# Patient Record
Sex: MALE | Race: BLACK OR AFRICAN AMERICAN | Employment: UNEMPLOYED | URBAN - METROPOLITAN AREA
[De-identification: names, ages, dates, MRNs, and addresses within clinical notes are randomized per-mention and may not be internally consistent; named-entity substitution may affect disease eponyms.]

---

## 2018-12-12 ENCOUNTER — HOSPITAL ENCOUNTER (EMERGENCY)
Age: 23
Discharge: HOME | End: 2018-12-12
Attending: EMERGENCY MEDICINE
Payer: SELF-PAY

## 2018-12-12 VITALS
HEIGHT: 65 IN | WEIGHT: 125 LBS | BODY MASS INDEX: 20.83 KG/M2 | DIASTOLIC BLOOD PRESSURE: 75 MMHG | HEART RATE: 78 BPM | RESPIRATION RATE: 18 BRPM | TEMPERATURE: 98.1 F | SYSTOLIC BLOOD PRESSURE: 123 MMHG | OXYGEN SATURATION: 100 %

## 2018-12-12 DIAGNOSIS — R56.9 SEIZURE (HCC): Primary | ICD-10-CM

## 2018-12-12 DIAGNOSIS — F12.10 CANNABIS ABUSE: ICD-10-CM

## 2018-12-12 LAB
AMPHET UR QL SCN: NEGATIVE
APPEARANCE UR: CLEAR
BACTERIA URNS QL MICRO: ABNORMAL /HPF
BARBITURATES UR QL SCN: NEGATIVE
BENZODIAZ UR QL: NEGATIVE
BILIRUB UR QL: NEGATIVE
CANNABINOIDS UR QL SCN: POSITIVE
CARBAMAZEPINE SERPL-MCNC: 6.5 UG/ML (ref 4–12)
COCAINE UR QL SCN: NEGATIVE
COLOR UR: YELLOW
EPITH CASTS URNS QL MICRO: ABNORMAL /LPF (ref 0–5)
GLUCOSE UR STRIP.AUTO-MCNC: NEGATIVE MG/DL
HDSCOM,HDSCOM: ABNORMAL
HGB UR QL STRIP: ABNORMAL
KETONES UR QL STRIP.AUTO: NEGATIVE MG/DL
LEUKOCYTE ESTERASE UR QL STRIP.AUTO: NEGATIVE
METHADONE UR QL: NEGATIVE
NITRITE UR QL STRIP.AUTO: NEGATIVE
OPIATES UR QL: NEGATIVE
PCP UR QL: NEGATIVE
PH UR STRIP: 5 [PH] (ref 5–8)
PROT UR STRIP-MCNC: 30 MG/DL
RBC #/AREA URNS HPF: ABNORMAL /HPF (ref 0–5)
SP GR UR REFRACTOMETRY: 1.02 (ref 1–1.03)
UROBILINOGEN UR QL STRIP.AUTO: 0.2 EU/DL (ref 0.2–1)
WBC URNS QL MICRO: ABNORMAL /HPF (ref 0–5)

## 2018-12-12 PROCEDURE — 99285 EMERGENCY DEPT VISIT HI MDM: CPT

## 2018-12-12 PROCEDURE — 81001 URINALYSIS AUTO W/SCOPE: CPT

## 2018-12-12 PROCEDURE — 80307 DRUG TEST PRSMV CHEM ANLYZR: CPT

## 2018-12-12 PROCEDURE — 74011250637 HC RX REV CODE- 250/637: Performed by: EMERGENCY MEDICINE

## 2018-12-12 PROCEDURE — 80156 ASSAY CARBAMAZEPINE TOTAL: CPT

## 2018-12-12 RX ORDER — ACETAMINOPHEN 500 MG
1000 TABLET ORAL
Status: COMPLETED | OUTPATIENT
Start: 2018-12-12 | End: 2018-12-12

## 2018-12-12 RX ORDER — CARBAMAZEPINE 200 MG/1
400 TABLET ORAL ONCE
Status: COMPLETED | OUTPATIENT
Start: 2018-12-12 | End: 2018-12-12

## 2018-12-12 RX ADMIN — ACETAMINOPHEN 1000 MG: 500 TABLET ORAL at 17:34

## 2018-12-12 RX ADMIN — CARBAMAZEPINE 400 MG: 200 TABLET ORAL at 19:23

## 2018-12-12 NOTE — DISCHARGE INSTRUCTIONS
Marijuana Use: Care Instructions  Your Care Instructions  During your exam, traces of marijuana were found in your body. The active chemical in marijuana is THC. THC usually can be found in urine for a few days after marijuana is used. If use is heavy, THC may be found for weeks after use has stopped. In the [place], marijuana laws vary widely. The drug is legal in some states, mainly as a medical treatment. But marijuana possession is still a crime under federal law. Many employers have strict rules about drug use. Many do drug testing. A positive drug test might cause you to lose your job. Or it might keep you from getting hired. Follow-up care is a key part of your treatment and safety. Be sure to make and go to all appointments, and call your doctor if you are having problems. It's also a good idea to know your test results and keep a list of the medicines you take. How can you care for yourself at home? · If you use marijuana, use it safely. Do not drive or operate heavy equipment. Using marijuana may affect your judgment and coordination. It can also increase your risk of being in a car crash. · Make sure you understand the laws in your area. Using marijuana may cause legal problems. · Know your employer's policies. When should you call for help? Watch closely for changes in your health, and contact your doctor if you think you have a problem with marijuana use. Where can you learn more? Go to http://charline-candie.info/. Enter K401 in the search box to learn more about \"Marijuana Use: Care Instructions. \"  Current as of: May 8, 2018  Content Version: 11.8  © 1803-9157 Healthwise[employer]. Care instructions adapted under license by [employer] (which disclaims liability or warranty for this information).  If you have questions about a medical condition or this instruction, always ask your healthcare professional. [address] any warranty or liability for your use of this information. Seizure: Care Instructions  Your Care Instructions    Seizures are caused by abnormal patterns of electrical signals in the brain. They are different for each person. Seizures can affect movement, speech, vision, or awareness. Some people have only slight shaking of a hand and do not pass out. Other people may pass out and have violent shaking of the whole body. Some people appear to stare into space. They are awake, but they can't respond normally. Later, they may not remember what happened. You may need tests to identify the type and cause of the seizures. A seizure may occur only once, or you may have them more than one time. Taking medicines as directed and following up with your doctor may help keep you from having more seizures. The doctor has checked you carefully, but problems can develop later. If you notice any problems or new symptoms, get medical treatment right away. Follow-up care is a key part of your treatment and safety. Be sure to make and go to all appointments, and call your doctor if you are having problems. It's also a good idea to know your test results and keep a list of the medicines you take. How can you care for yourself at home? · Be safe with medicines. Take your medicines exactly as prescribed. Call your doctor if you think you are having a problem with your medicine. · Do not do any activity that could be dangerous to you or others until your doctor says it is safe to do so. For example, do not drive a car, operate machinery, swim, or climb ladders. · Be sure that anyone treating you for any health problem knows that you have had a seizure and what medicines you are taking for it. · Identify and avoid things that may make you more likely to have a seizure. These may include lack of sleep, alcohol or drug use, stress, or not eating. · Make sure you go to your follow-up appointment.   When should you call for help?  Call 911 anytime you think you may need emergency care. For example, call if:    · You have another seizure.     · You have more than one seizure in 24 hours.     · You have new symptoms, such as trouble walking, speaking, or thinking clearly.    Call your doctor now or seek immediate medical care if:    · You are not acting normally.    Watch closely for changes in your health, and be sure to contact your doctor if you have any problems. Where can you learn more? Go to http://charline-candie.info/. Enter B004 in the search box to learn more about \"Seizure: Care Instructions. \"  Current as of: June 4, 2018  Content Version: 11.8  © 3164-1297 Healthwise, [employer]. Care instructions adapted under license by [employer] (which disclaims liability or warranty for this information). If you have questions about a medical condition or this instruction, always ask your healthcare professional. [address] any warranty or liability for your use of this information.

## 2018-12-12 NOTE — ED TRIAGE NOTES
Pt arrived per EMS post-ictal from seizure at the house of his child's mother. Pt w/ complaints of headache. Pt reports taking his tegretol today for the first 2 doses out of the 3 during the day.

## 2018-12-12 NOTE — ED PROVIDER NOTES
EMERGENCY DEPARTMENT HISTORY AND PHYSICAL EXAM    Date: 12/12/2018  Patient Name: Leighann Ring. History of Presenting Illness     Chief Complaint   Patient presents with    Seizure         History Provided By: Patient    Chief Complaint: seizure  Duration: PTA   Timing:  Acute  Location: generalized  Quality: grand mal  Associated Symptoms: HA    Additional History (Context):   5:17 PM  Leighann Caballero is a 24 y.o. male with PMHx of seizure who presents to the emergency department via EMS for evaluation of witnessed grand mal seizure onset PTA. Pt fell against a wall, knocked a television over and landed on his couch. Associated sxs include HA. Pt states he takes Tegretol for seizures. FSBS at 136 mg/dL. Pt is followed by neurologist, Dr. Darnell Warner, at [facility], [place], operated by [facility]. Pt medication has been adjusted a few times over the course of the month. No PSHx. Pt denies fever and any other associated signs or sxs. PCP: Lindy Zhang MD    Current Facility-Administered Medications   Medication Dose Route Frequency Provider Last Rate Last Dose    carBAMazepine (TEGretol) tablet 400 mg  400 mg Oral ONCE Han Clifford MD         Current Outpatient Medications   Medication Sig Dispense Refill    carBAMazepine (TEGRETOL) 200 mg tablet Take 200 mg by mouth three (3) times daily. Past History     Past Medical History:  Past Medical History:   Diagnosis Date    Seizures ([address]        Past Surgical History:  Past Surgical History:   Procedure Laterality Date    HX ORTHOPAEDIC      foot       Family History:  History reviewed. No pertinent family history.     Social History:  Social History     Tobacco Use    Smoking status: Current Every Day Smoker     Packs/day: 0.25    Smokeless tobacco: Never Used   Substance Use Topics    Alcohol use: Yes     Comment: occasionally    Drug use: Yes     Types: Marijuana       Allergies:  No Known Allergies      Review of Systems   Review of Systems Constitutional: Negative for activity change, appetite change, fever and unexpected weight change. HENT: Negative for congestion and sore throat. Eyes: Negative for pain and redness. Respiratory: Negative for cough and shortness of breath. Cardiovascular: Negative for chest pain and palpitations. Gastrointestinal: Negative for abdominal pain, diarrhea, nausea and vomiting. Endocrine: Negative for polydipsia and polyuria. Genitourinary: Negative for difficulty urinating and dysuria. Musculoskeletal: Negative for back pain and neck pain. Skin: Negative for pallor and rash. Neurological: Positive for seizures and headaches. All other systems reviewed and are negative. Physical Exam     Vitals:    12/12/18 1724   BP: 130/79   Pulse: 78   Resp: 18   Temp: 98.1 °F (36.7 °C)   SpO2: 100%   Weight: 56.7 kg (125 lb)   Height: 5' 5\" (1.651 m)     Physical Exam   Constitutional: He is oriented to person, place, and time. He appears well-developed and well-nourished. HENT:   Head: Normocephalic and atraumatic. Right Ear: External ear normal.   Left Ear: External ear normal.   Nose: Nose normal.   Mouth/Throat: Oropharynx is clear and moist.   Eyes: Conjunctivae and EOM are normal. Pupils are equal, round, and reactive to light. Neck: Normal range of motion. Neck supple. No JVD present. No tracheal deviation present. No thyromegaly present. Cardiovascular: Normal rate, regular rhythm, normal heart sounds and intact distal pulses. Exam reveals no gallop and no friction rub. No murmur heard. Pulmonary/Chest: Effort normal and breath sounds normal. No respiratory distress. He has no wheezes. He has no rales. Abdominal: Soft. Bowel sounds are normal. He exhibits no distension and no mass. There is no tenderness. There is no rebound and no guarding. Musculoskeletal: Normal range of motion. Neurological: He is alert and oriented to person, place, and time. He has normal reflexes.  No cranial nerve deficit. CN II-XII intact, no facial droop or asymmetry; No pronator drift; finger-nose-finger intact; good  and equal strength 5/5 bilateral upper and lower extremities; DTRs: 2+ upper and lower extremities, symmetric bilaterally; sensation is intact to light touch and position sense upper and lower extremities symmetric bilaterally;  Gait normal   Skin: Skin is warm and dry. No rash noted. Psychiatric: He has a normal mood and affect. His behavior is normal.   Nursing note and vitals reviewed. Diagnostic Study Results     Labs -     Recent Results (from the past 12 hour(s))   DRUG SCREEN, URINE    Collection Time: 12/12/18  5:45 PM   Result Value Ref Range    BENZODIAZEPINES NEGATIVE  NEG      BARBITURATES NEGATIVE  NEG      THC (TH-CANNABINOL) POSITIVE (A) NEG      OPIATES NEGATIVE  NEG      PCP(PHENCYCLIDINE) NEGATIVE  NEG      COCAINE NEGATIVE  NEG      AMPHETAMINES NEGATIVE  NEG      METHADONE NEGATIVE  NEG      HDSCOM (NOTE)    URINALYSIS W/ RFLX MICROSCOPIC    Collection Time: 12/12/18  5:45 PM   Result Value Ref Range    Color YELLOW      Appearance CLEAR      Specific gravity 1.018 1.005 - 1.030      pH (UA) 5.0 5.0 - 8.0      Protein 30 (A) NEG mg/dL    Glucose NEGATIVE  NEG mg/dL    Ketone NEGATIVE  NEG mg/dL    Bilirubin NEGATIVE  NEG      Blood TRACE (A) NEG      Urobilinogen 0.2 0.2 - 1.0 EU/dL    Nitrites NEGATIVE  NEG      Leukocyte Esterase NEGATIVE  NEG         Radiologic Studies -   No orders to display     CT Results  (Last 48 hours)    None        CXR Results  (Last 48 hours)    None          Medications given in the ED-  Medications   carBAMazepine (TEGretol) tablet 400 mg (not administered)   acetaminophen (TYLENOL) tablet 1,000 mg (1,000 mg Oral Given 12/12/18 1734)         Medical Decision Making   I am the first provider for this patient.     I reviewed the vital signs, available nursing notes, past medical history, past surgical history, family history and social history. Vital Signs-Reviewed the patient's vital signs. Pulse Oximetry Analysis - 100% on room air. Records Reviewed: Nursing Notes and Old Medical Records    Provider Notes (Medical Decision Making): DDx: Recurring seizure, hypoglycemia, drug abuse, UTI, subtherapeutic levels of seizure medication. Procedures:  Procedures    ED Course:   5:17 AM Initial assessment performed. The patients presenting problems have been discussed, and they are in agreement with the care plan formulated and outlined with them. I have encouraged them to ask questions as they arise throughout their visit. 6:30PM  Case discussed with Dr. Veena Flores who recommends giving additional Tegretol and follow-up with PCP. Diagnosis and Disposition     DISCHARGE NOTE:  6:59 PM  Isiah Brantley 's  results have been reviewed with him. He has been counseled regarding his diagnosis, treatment, and plan. He verbally conveys understanding and agreement of the signs, symptoms, diagnosis, treatment and prognosis and additionally agrees to follow up as discussed. He also agrees with the care-plan and conveys that all of his questions have been answered. I have also provided discharge instructions for him that include: educational information regarding their diagnosis and treatment, and list of reasons why they would want to return to the ED prior to their follow-up appointment, should his condition change. He has been provided with education for proper emergency department utilization. Discussion: Pt presents with recurrent seizure. Stable in ED with nl neuro exam. Tylenol given for HA with improvement. Labs remarkable for THC on UDS. U/A unremarkable. Pt was given Tegretol 400 mg PO as per neurology consultation. Pt advised to stop smoking cannabis and f/u with Dr. Gianfranco Hameed for full evaluation. CLINICAL IMPRESSION:    1. Seizure ([address]    2. Cannabis abuse        PLAN:  1. D/C Home  2.    Current Discharge Medication List 3.   Follow-up Information     Follow up With Specialties Details Why Dorys Saeed MD Emergency Medicine, Internal Medicine Schedule an appointment as soon as possible for a visit in 2 days For Primary Care Follow Up [address] [id number]  [phone]      [facility] EMERGENCY DEPT Emergency Medicine Go to If symptoms worsen, As needed [address] [id number]  [phone]        _______________________________    Attestations: This note is prepared by Sven Pleitez, acting as Scribe for Viktoriya Schmidt MD.    Viktoriya Schmidt MD:  The scribe's documentation has been prepared under my direction and personally reviewed by me in its entirety.   I confirm that the note above accurately reflects all work, treatment, procedures, and medical decision making performed by me.  _______________________________

## 2019-03-12 ENCOUNTER — APPOINTMENT (OUTPATIENT)
Dept: CT IMAGING | Age: 24
DRG: 053 | End: 2019-03-12
Attending: EMERGENCY MEDICINE
Payer: MEDICAID

## 2019-03-12 ENCOUNTER — HOSPITAL ENCOUNTER (INPATIENT)
Age: 24
LOS: 1 days | Discharge: HOME | DRG: 053 | End: 2019-03-13
Attending: EMERGENCY MEDICINE | Admitting: INTERNAL MEDICINE
Payer: MEDICAID

## 2019-03-12 DIAGNOSIS — T50.905A ADVERSE EFFECT OF DRUG, INITIAL ENCOUNTER: ICD-10-CM

## 2019-03-12 DIAGNOSIS — G40.909 SEIZURE DISORDER (HCC): Primary | ICD-10-CM

## 2019-03-12 LAB
ALBUMIN SERPL-MCNC: 4.6 G/DL (ref 3.4–5)
ALBUMIN/GLOB SERPL: 1.2 {RATIO} (ref 0.8–1.7)
ALP SERPL-CCNC: 91 U/L (ref 45–117)
ALT SERPL-CCNC: 44 U/L (ref 16–61)
AMPHET UR QL SCN: NEGATIVE
ANION GAP SERPL CALC-SCNC: 18 MMOL/L (ref 3–18)
APPEARANCE UR: CLEAR
AST SERPL-CCNC: 24 U/L (ref 15–37)
BACTERIA URNS QL MICRO: ABNORMAL /HPF
BARBITURATES UR QL SCN: NEGATIVE
BASOPHILS # BLD: 0 K/UL (ref 0–0.1)
BASOPHILS NFR BLD: 0 % (ref 0–2)
BENZODIAZ UR QL: POSITIVE
BILIRUB SERPL-MCNC: 0.2 MG/DL (ref 0.2–1)
BILIRUB UR QL: NEGATIVE
BUN SERPL-MCNC: 10 MG/DL (ref 7–18)
BUN/CREAT SERPL: 7 (ref 12–20)
CALCIUM SERPL-MCNC: 9.1 MG/DL (ref 8.5–10.1)
CANNABINOIDS UR QL SCN: POSITIVE
CARBAMAZEPINE SERPL-MCNC: 11 UG/ML (ref 4–12)
CHLORIDE SERPL-SCNC: 104 MMOL/L (ref 100–108)
CO2 SERPL-SCNC: 17 MMOL/L (ref 21–32)
COCAINE UR QL SCN: NEGATIVE
COLOR UR: YELLOW
CREAT SERPL-MCNC: 1.5 MG/DL (ref 0.6–1.3)
DIFFERENTIAL METHOD BLD: ABNORMAL
EOSINOPHIL # BLD: 0 K/UL (ref 0–0.4)
EOSINOPHIL NFR BLD: 0 % (ref 0–5)
EPITH CASTS URNS QL MICRO: ABNORMAL /LPF (ref 0–5)
ERYTHROCYTE [DISTWIDTH] IN BLOOD BY AUTOMATED COUNT: 11.7 % (ref 11.6–14.5)
ETHANOL SERPL-MCNC: <3 MG/DL (ref 0–3)
GLOBULIN SER CALC-MCNC: 3.9 G/DL (ref 2–4)
GLUCOSE SERPL-MCNC: 136 MG/DL (ref 74–99)
GLUCOSE UR STRIP.AUTO-MCNC: NEGATIVE MG/DL
HCT VFR BLD AUTO: 46 % (ref 36–48)
HDSCOM,HDSCOM: ABNORMAL
HGB BLD-MCNC: 15.6 G/DL (ref 13–16)
HGB UR QL STRIP: ABNORMAL
KETONES UR QL STRIP.AUTO: NEGATIVE MG/DL
LEUKOCYTE ESTERASE UR QL STRIP.AUTO: NEGATIVE
LYMPHOCYTES # BLD: 1.1 K/UL (ref 0.9–3.6)
LYMPHOCYTES NFR BLD: 7 % (ref 21–52)
MCH RBC QN AUTO: 30.4 PG (ref 24–34)
MCHC RBC AUTO-ENTMCNC: 33.9 G/DL (ref 31–37)
MCV RBC AUTO: 89.5 FL (ref 74–97)
METHADONE UR QL: NEGATIVE
MONOCYTES # BLD: 1.1 K/UL (ref 0.05–1.2)
MONOCYTES NFR BLD: 7 % (ref 3–10)
NEUTS SEG # BLD: 14.3 K/UL (ref 1.8–8)
NEUTS SEG NFR BLD: 86 % (ref 40–73)
NITRITE UR QL STRIP.AUTO: NEGATIVE
OPIATES UR QL: NEGATIVE
PCP UR QL: NEGATIVE
PH UR STRIP: 5 [PH] (ref 5–8)
PLATELET # BLD AUTO: 283 K/UL (ref 135–420)
PMV BLD AUTO: 9.6 FL (ref 9.2–11.8)
POTASSIUM SERPL-SCNC: 3.7 MMOL/L (ref 3.5–5.5)
PROT SERPL-MCNC: 8.5 G/DL (ref 6.4–8.2)
PROT UR STRIP-MCNC: 30 MG/DL
RBC # BLD AUTO: 5.14 M/UL (ref 4.7–5.5)
RBC #/AREA URNS HPF: ABNORMAL /HPF (ref 0–5)
SODIUM SERPL-SCNC: 139 MMOL/L (ref 136–145)
SP GR UR REFRACTOMETRY: 1.01 (ref 1–1.03)
UROBILINOGEN UR QL STRIP.AUTO: 0.2 EU/DL (ref 0.2–1)
WBC # BLD AUTO: 16.5 K/UL (ref 4.6–13.2)
WBC URNS QL MICRO: ABNORMAL /HPF (ref 0–5)

## 2019-03-12 PROCEDURE — 81001 URINALYSIS AUTO W/SCOPE: CPT

## 2019-03-12 PROCEDURE — 96372 THER/PROPH/DIAG INJ SC/IM: CPT

## 2019-03-12 PROCEDURE — 70450 CT HEAD/BRAIN W/O DYE: CPT

## 2019-03-12 PROCEDURE — 74011250636 HC RX REV CODE- 250/636: Performed by: EMERGENCY MEDICINE

## 2019-03-12 PROCEDURE — 80053 COMPREHEN METABOLIC PANEL: CPT

## 2019-03-12 PROCEDURE — 96376 TX/PRO/DX INJ SAME DRUG ADON: CPT

## 2019-03-12 PROCEDURE — 85025 COMPLETE CBC W/AUTO DIFF WBC: CPT

## 2019-03-12 PROCEDURE — 99285 EMERGENCY DEPT VISIT HI MDM: CPT

## 2019-03-12 PROCEDURE — 96365 THER/PROPH/DIAG IV INF INIT: CPT

## 2019-03-12 PROCEDURE — 80307 DRUG TEST PRSMV CHEM ANLYZR: CPT

## 2019-03-12 PROCEDURE — 74011250636 HC RX REV CODE- 250/636

## 2019-03-12 PROCEDURE — 77030011943

## 2019-03-12 PROCEDURE — 80156 ASSAY CARBAMAZEPINE TOTAL: CPT

## 2019-03-12 PROCEDURE — 96375 TX/PRO/DX INJ NEW DRUG ADDON: CPT

## 2019-03-12 RX ORDER — LEVETIRACETAM 10 MG/ML
1000 INJECTION INTRAVASCULAR
Status: COMPLETED | OUTPATIENT
Start: 2019-03-12 | End: 2019-03-12

## 2019-03-12 RX ORDER — LEVETIRACETAM 500 MG/1
500 TABLET ORAL 2 TIMES DAILY
Qty: 60 TAB | Refills: 0 | Status: SHIPPED | OUTPATIENT
Start: 2019-03-12 | End: 2019-04-11

## 2019-03-12 RX ORDER — LORAZEPAM 2 MG/ML
INJECTION INTRAMUSCULAR
Status: COMPLETED
Start: 2019-03-12 | End: 2019-03-12

## 2019-03-12 RX ORDER — MIDAZOLAM HYDROCHLORIDE 1 MG/ML
4 INJECTION, SOLUTION INTRAMUSCULAR; INTRAVENOUS
Status: COMPLETED | OUTPATIENT
Start: 2019-03-12 | End: 2019-03-12

## 2019-03-12 RX ORDER — LORAZEPAM 2 MG/ML
2 INJECTION INTRAMUSCULAR ONCE
Status: COMPLETED | OUTPATIENT
Start: 2019-03-12 | End: 2019-03-12

## 2019-03-12 RX ORDER — MIDAZOLAM HYDROCHLORIDE 1 MG/ML
INJECTION, SOLUTION INTRAMUSCULAR; INTRAVENOUS
Status: COMPLETED
Start: 2019-03-12 | End: 2019-03-12

## 2019-03-12 RX ORDER — DIPHENHYDRAMINE HYDROCHLORIDE 50 MG/ML
50 INJECTION, SOLUTION INTRAMUSCULAR; INTRAVENOUS ONCE
Status: COMPLETED | OUTPATIENT
Start: 2019-03-12 | End: 2019-03-12

## 2019-03-12 RX ORDER — LORAZEPAM 2 MG/ML
2 INJECTION INTRAMUSCULAR
Status: COMPLETED | OUTPATIENT
Start: 2019-03-12 | End: 2019-03-12

## 2019-03-12 RX ORDER — HALOPERIDOL 5 MG/ML
5 INJECTION INTRAMUSCULAR ONCE
Status: COMPLETED | OUTPATIENT
Start: 2019-03-12 | End: 2019-03-12

## 2019-03-12 RX ADMIN — LORAZEPAM 2 MG: 2 INJECTION INTRAMUSCULAR at 17:44

## 2019-03-12 RX ADMIN — MIDAZOLAM HYDROCHLORIDE 4 MG: 1 INJECTION, SOLUTION INTRAMUSCULAR; INTRAVENOUS at 17:02

## 2019-03-12 RX ADMIN — LEVETIRACETAM 1000 MG: 10 INJECTION INTRAVENOUS at 19:08

## 2019-03-12 RX ADMIN — LORAZEPAM 2 MG: 2 INJECTION INTRAMUSCULAR at 16:45

## 2019-03-12 RX ADMIN — LORAZEPAM 2 MG: 2 INJECTION INTRAMUSCULAR; INTRAVENOUS at 16:45

## 2019-03-12 RX ADMIN — HALOPERIDOL LACTATE 5 MG: 5 INJECTION INTRAMUSCULAR at 16:50

## 2019-03-12 RX ADMIN — LORAZEPAM 2 MG: 2 INJECTION INTRAMUSCULAR; INTRAVENOUS at 17:44

## 2019-03-12 RX ADMIN — DIPHENHYDRAMINE HYDROCHLORIDE 50 MG: 50 INJECTION INTRAMUSCULAR; INTRAVENOUS at 16:53

## 2019-03-12 NOTE — ED PROVIDER NOTES
EMERGENCY DEPARTMENT HISTORY AND PHYSICAL EXAM    Date: 3/12/2019  Patient Name: Zeina Damian. History of Presenting Illness     Chief Complaint   Patient presents with    Seizure         History Provided By: Patient's Brother and EMS    Chief Complaint: Seizure  Duration: 25 Minutes  Timing:  Acute  Location: N/A  Severity: 3 episodes  Associated Symptoms: denies any other associated signs or symptoms    Additional History (Context):   4:41 PM  Zeina Vallejo is a 24 y.o. male with PMHX of seizures who presents to the emergency department C/O 3 episodes of seizure like activity onset 25 minutes ago. Brother reports the patient is on Tegretol for his seizures. States he has had his dosages increased multiple times. Brother denies patient being sick prior to today's seizures. Endorses tobacco use and patient drank beer last night. HPI is limited due to patient being post-ictal.     PCP: Susie Boggs MD    Current Outpatient Medications   Medication Sig Dispense Refill    levETIRAcetam (KEPPRA) 500 mg tablet Take 1 Tab by mouth two (2) times a day for 30 days. 60 Tab 0    carBAMazepine (TEGRETOL) 200 mg tablet Take 200 mg by mouth three (3) times daily. Past History     Past Medical History:  Past Medical History:   Diagnosis Date    Seizures ([address]        Past Surgical History:  Past Surgical History:   Procedure Laterality Date    HX ORTHOPAEDIC      foot       Family History:  History reviewed. No pertinent family history. Social History:  Social History     Tobacco Use    Smoking status: Current Every Day Smoker     Packs/day: 0.25    Smokeless tobacco: Never Used   Substance Use Topics    Alcohol use: Yes     Comment: occasionally    Drug use: Yes     Types: Marijuana       Allergies:  No Known Allergies      Review of Systems   Review of Systems   Unable to perform ROS: Mental status change (post-ictal)   Neurological: Positive for seizures.        Physical Exam     Vitals: 03/12/19 2142 03/12/19 2200 03/12/19 2258 03/12/19 2329   BP: 119/68 119/68 119/70    Pulse: (!) 104 (!) 102 100 (!) 101   Resp: 20 19 20 15   Temp: 98 °F (36.7 °C)  98.5 °F (36.9 °C)    SpO2: 96% 94% 98%    Weight:       Height:         Physical Exam   Nursing note and vitals reviewed. Constitutional: Agitated. Flailing around on the bed, trying to hit and bite staff, moves all extremities, does not follow direction, keep yelling \"come on man\"  Head: Normocephalic, Atraumatic  Eyes: Pupils are equal, round, and reactive to light, EOMI  Neck: Supple, non-tender  Cardiovascular: Regular rate and rhythm, no murmurs, rubs, or gallops  Chest: Normal work of breathing and chest excursion bilaterally  Lungs: Clear to ausculation bilaterally  Abdomen: Soft, non tender, non distended, normoactive bowel sounds  Back: No evidence of trauma or deformity  Extremities: No evidence of trauma or deformity, no LE edema  Skin: Warm and dry, normal cap refill  Neuro: Alert, yelling, moves all extremities, speech clear  Psychiatric: Agitated mood and affect      Diagnostic Study Results     Labs -     Recent Results (from the past 12 hour(s))   CBC WITH AUTOMATED DIFF    Collection Time: 03/12/19  5:20 PM   Result Value Ref Range    WBC 16.5 (H) 4.6 - 13.2 K/uL    RBC 5.14 4.70 - 5.50 M/uL    HGB 15.6 13.0 - 16.0 g/dL    HCT 46.0 36.0 - 48.0 %    MCV 89.5 74.0 - 97.0 FL    MCH 30.4 24.0 - 34.0 PG    MCHC 33.9 31.0 - 37.0 g/dL    RDW 11.7 11.6 - 14.5 %    PLATELET 288 436 - 522 K/uL    MPV 9.6 9.2 - 11.8 FL    NEUTROPHILS 86 (H) 40 - 73 %    LYMPHOCYTES 7 (L) 21 - 52 %    MONOCYTES 7 3 - 10 %    EOSINOPHILS 0 0 - 5 %    BASOPHILS 0 0 - 2 %    ABS. NEUTROPHILS 14.3 (H) 1.8 - 8.0 K/UL    ABS. LYMPHOCYTES 1.1 0.9 - 3.6 K/UL    ABS. MONOCYTES 1.1 0.05 - 1.2 K/UL    ABS. EOSINOPHILS 0.0 0.0 - 0.4 K/UL    ABS.  BASOPHILS 0.0 0.0 - 0.1 K/UL    DF AUTOMATED     METABOLIC PANEL, COMPREHENSIVE    Collection Time: 03/12/19  5:20 PM   Result Value Ref Range    Sodium 139 136 - 145 mmol/L    Potassium 3.7 3.5 - 5.5 mmol/L    Chloride 104 100 - 108 mmol/L    CO2 17 (L) 21 - 32 mmol/L    Anion gap 18 3.0 - 18 mmol/L    Glucose 136 (H) 74 - 99 mg/dL    BUN 10 7.0 - 18 MG/DL    Creatinine 1.50 (H) 0.6 - 1.3 MG/DL    BUN/Creatinine ratio 7 (L) 12 - 20      GFR est AA >60 >60 ml/min/1.73m2    GFR est non-AA 57 (L) >60 ml/min/1.73m2    Calcium 9.1 8.5 - 10.1 MG/DL    Bilirubin, total 0.2 0.2 - 1.0 MG/DL    ALT (SGPT) 44 16 - 61 U/L    AST (SGOT) 24 15 - 37 U/L    Alk.  phosphatase 91 45 - 117 U/L    Protein, total 8.5 (H) 6.4 - 8.2 g/dL    Albumin 4.6 3.4 - 5.0 g/dL    Globulin 3.9 2.0 - 4.0 g/dL    A-G Ratio 1.2 0.8 - 1.7     ETHYL ALCOHOL    Collection Time: 03/12/19  5:20 PM   Result Value Ref Range    ALCOHOL(ETHYL),SERUM <3 0 - 3 MG/DL   CARBAMAZEPINE    Collection Time: 03/12/19  5:20 PM   Result Value Ref Range    Carbamazepine 11.0 4.0 - 12.0 ug/mL   URINALYSIS W/ RFLX MICROSCOPIC    Collection Time: 03/12/19  6:20 PM   Result Value Ref Range    Color YELLOW      Appearance CLEAR      Specific gravity 1.013 1.005 - 1.030      pH (UA) 5.0 5.0 - 8.0      Protein 30 (A) NEG mg/dL    Glucose NEGATIVE  NEG mg/dL    Ketone NEGATIVE  NEG mg/dL    Bilirubin NEGATIVE  NEG      Blood SMALL (A) NEG      Urobilinogen 0.2 0.2 - 1.0 EU/dL    Nitrites NEGATIVE  NEG      Leukocyte Esterase NEGATIVE  NEG     DRUG SCREEN, URINE    Collection Time: 03/12/19  6:20 PM   Result Value Ref Range    BENZODIAZEPINES POSITIVE (A) NEG      BARBITURATES NEGATIVE  NEG      THC (TH-CANNABINOL) POSITIVE (A) NEG      OPIATES NEGATIVE  NEG      PCP(PHENCYCLIDINE) NEGATIVE  NEG      COCAINE NEGATIVE  NEG      AMPHETAMINES NEGATIVE  NEG      METHADONE NEGATIVE  NEG      HDSCOM (NOTE)    URINE MICROSCOPIC ONLY    Collection Time: 03/12/19  6:20 PM   Result Value Ref Range    WBC 0 to 3 0 - 5 /hpf    RBC 0 to 3 0 - 5 /hpf    Epithelial cells FEW 0 - 5 /lpf    Bacteria FEW (A) NEG /hpf Radiologic Studies -   CT HEAD WO CONT   Final Result   IMPRESSION:      Normal CT head. CT Results  (Last 48 hours)               03/12/19 2010  CT HEAD WO CONT Final result    Impression:  IMPRESSION:       Normal CT head. Narrative:  EXAM: CT head       CLINICAL INDICATION/HISTORY: Confusion and delirium. COMPARISON: None. TECHNIQUE: Axial CT imaging of the head  was obtained from skull base to vertex   without intravenous contrast. Coronal and sagittal reconstructions were   obtained. One or more dose reduction techniques were used on this CT: automated   exposure control, adjustment of the mAs and/or kVp according to patient's size,   and iterative reconstruction techniques. The specific techniques utilized on   this CT exam have been documented in the patient's electronic medical record. Digital imaging and communications and medicine (DICOM) format image data are   available to nonaffiliated external healthcare facilities or entities on a   secure, media free, reciprocally searchable basis with patient authorization for   at least a 12 month period after this study. _______________       FINDINGS:       BRAIN AND POSTERIOR FOSSA: The sulci, folia, ventricles and basal cisterns are   within normal limits for the patient?s age. There is no intracranial hemorrhage,   mass effect, or shift of midline structures. There are no areas of abnormal   parenchymal attenuation. EXTRA-AXIAL SPACES AND MENINGES: There are no abnormal extra-axial fluid   collections. CALVARIUM: No acute osseous abnormality. SINUSES: The visualized portions of the paranasal sinuses and mastoid air cells   are well aerated.        OTHER: None.       _______________               CXR Results  (Last 48 hours)    None          Medications given in the ED-  Medications   LORazepam (ATIVAN) injection 2 mg (2 mg IntraVENous Given 3/12/19 4488)   diphenhydrAMINE (BENADRYL) injection 50 mg (50 mg IntraVENous Given 3/12/19 1653)   haloperidol lactate (HALDOL) injection 5 mg (5 mg IntraMUSCular Given 3/12/19 1650)   midazolam (PF) (VERSED) injection 4 mg (4 mg IntraVENous Given 3/12/19 1702)   LORazepam (ATIVAN) injection 2 mg (2 mg IntraVENous Given 3/12/19 1744)   levETIRAcetam in saline (iso-os) (KEPPRA) infusion 1,000 mg (0 mg IntraVENous IV Completed 3/12/19 1930)         Medical Decision Making   I am the first provider for this patient. I reviewed the vital signs, available nursing notes, past medical history, past surgical history, family history and social history. Vital Signs-Reviewed the patient's vital signs. Pulse Oximetry Analysis - 97% on RA     Cardiac Monitor:  Rate: 90 bpm  Rhythm: NSR    Records Reviewed: Nursing Notes and Old Medical Records  Patient's PCP is [facility]. He was last seen in January and is supposed to be on Tegretol  mg BID. He has a history of non-compliance and his Tegretol levels are usually sub-therapeutic. He has a history of marijuana abuse. Procedures:  Procedures    ED Course:   4:41 PM Initial assessment performed. The patients presenting problems have been discussed, and they are in agreement with the care plan formulated and outlined with them. I have encouraged them to ask questions as they arise throughout their visit. 4:54 PM Patient remains violent and curing. Additional sedation ordered for patient and staff safety. 6:35 PM Discussed patient's history, exam, and available diagnostics results with Massiel Forrest MD, Neurology, who states to load him with a gram of Keppra and start Keppra 500 mg BID. Farrah Lyman MD's Discussion:    24 y.o male brought in by EMS after a reports of 2 seizures today. Patient combative, cursing, biting, and kicking. Requiring multiple rounds of sedation. Now resting comfortably. CT head pending.  If negative and patient returns to neurological baseline plan for discharge with Keppra BID and neurology follow up. BEDSIDE TRANSFER OF CARE:  7:25 PM  Patient care will be transferred from Prashant Gary MD to Marta Barrera MD.  Discussed available diagnostic results and care plan at length at beside. Patient and family made aware of provider change. All questions answered. Patient and family voiced understanding. Written by PETRA Brady, as dictated by Prashant Gary MD.    12:11 AM Discussed patient's history, exam, and available diagnostics results with Landry Damian MD, hospitalist, who will admit to observation. Diagnosis and Disposition     Critical Care Time:  I have spent 42 minutes of critical care time involved in lab review, consultations with specialist, family decision-making, and documentation. During this entire length of time I was immediately available to the patient. Critical Care: The reason for providing this level of medical care for this critically ill patient was due a critical illness that impaired one or more vital organ systems such that there was a high probability of imminent or life threatening deterioration in the patients condition. This care involved high complexity decision making to assess, manipulate, and support vital system functions, to treat this degree vital organ system failure and to prevent further life threatening deterioration of the patients condition. Written by PETRA Brady, as dictated by Prashant Gary MD.    12:32 AM  I have spent 60 minutes of critical care time involved in lab review, consultations with specialist, family decision-making, and documentation. During this entire length of time I was immediately available to the patient. Critical Care:   The reason for providing this level of medical care for this critically ill patient was due a critical illness that impaired one or more vital organ systems such that there was a high probability of imminent or life threatening deterioration in the patients condition. This care involved high complexity decision making to assess, manipulate, and support vital system functions, to treat this degreee vital organ system failure and to prevent further life threatening deterioration of the patients condition. Written by PETRA Hoibe, as dictated by Heriberto Cordova MD.    Core Measures:  For Hospitalized Patients:    1. Hospitalization Decision Time:  The decision to hospitalize the patient was made by Heriberto Cordova MD at 12:11 AM on 3/13/2019    2. Aspirin: Aspirin was not given because the patient did not present with a stroke at the time of their Emergency Department evaluation    12:11 AM  Patient is being admitted to the hospital by Yariel Liriano MD. The results of their tests and reasons for their admission have been discussed with them and/or available family. They convey agreement and understanding for the need to be admitted and for their admission diagnosis. CONDITIONS ON ADMISSION:  Sepsis is not present at the time of admission. Deep Vein Thrombosis is not present at the time of admission. Thrombosis is not present at the time of admission. Urinary Tract Infection is not present at the time of admission. Pneumonia is not present at the time of admission. MRSA is not present at the time of admission. Wound infection is not present at the time of admission. Pressure Ulcer is not present at the time of admission. CLINICAL IMPRESSION:    1. Seizure disorder ([address]    2. Adverse effect of drug, initial encounter        PLAN:  1. Admit to observation _______________________________    Attestations: This note is prepared by Odette Hall, acting as Scribe for Johyn Caruso MD.    Johny Caruso MD:  The scribe's documentation has been prepared under my direction and personally reviewed by me in its entirety.   I confirm that the note above accurately reflects all work, treatment, procedures, and medical decision making performed by me. This note is prepared by Percy Ovideo, acting as Scribe for [employer], MD.    [employer], MD:  The scribe's documentation has been prepared under my direction and personally reviewed by me in its entirety.   I confirm that the note above accurately reflects all work, treatment, procedures, and medical decision making performed by me.  _______________________________

## 2019-03-12 NOTE — ED TRIAGE NOTES
Patient arrived via EMS for seizure activity, per EMS patient has at 3 seizures  today last seizure at [id number], EMS reports lasted 2 minutes, patient's brother reports hx seizures, patient restless patient moving around in bed, unable to sit still

## 2019-03-12 NOTE — ED NOTES
Pt resting in stretcher, sedate. Remains 1:1 with sitter and in 4 point restraints. Brother elected to go home and left his number when pt is ready for discharge.

## 2019-03-13 VITALS
BODY MASS INDEX: 23.32 KG/M2 | SYSTOLIC BLOOD PRESSURE: 115 MMHG | OXYGEN SATURATION: 100 % | RESPIRATION RATE: 15 BRPM | WEIGHT: 140 LBS | HEART RATE: 86 BPM | TEMPERATURE: 98.8 F | HEIGHT: 65 IN | DIASTOLIC BLOOD PRESSURE: 75 MMHG

## 2019-03-13 PROBLEM — G40.901 STATUS EPILEPTICUS (HCC): Status: ACTIVE | Noted: 2019-03-13

## 2019-03-13 PROBLEM — R56.9 POST-ICTAL STATE (HCC): Status: ACTIVE | Noted: 2019-03-13

## 2019-03-13 PROBLEM — R56.9 SEIZURE (HCC): Status: ACTIVE | Noted: 2019-03-13

## 2019-03-13 PROBLEM — N17.9 ACUTE KIDNEY INJURY (HCC): Status: ACTIVE | Noted: 2019-03-13

## 2019-03-13 LAB
ALBUMIN SERPL-MCNC: 4 G/DL (ref 3.4–5)
ALBUMIN/GLOB SERPL: 1.2 {RATIO} (ref 0.8–1.7)
ALP SERPL-CCNC: 75 U/L (ref 45–117)
ALT SERPL-CCNC: 36 U/L (ref 16–61)
ANION GAP SERPL CALC-SCNC: 8 MMOL/L (ref 3–18)
AST SERPL-CCNC: 39 U/L (ref 15–37)
BASOPHILS # BLD: 0.1 K/UL (ref 0–0.1)
BASOPHILS NFR BLD: 1 % (ref 0–2)
BILIRUB SERPL-MCNC: 0.4 MG/DL (ref 0.2–1)
BUN SERPL-MCNC: 11 MG/DL (ref 7–18)
BUN/CREAT SERPL: 8 (ref 12–20)
CALCIUM SERPL-MCNC: 8.8 MG/DL (ref 8.5–10.1)
CARBAMAZEPINE SERPL-MCNC: 10.6 UG/ML (ref 4–12)
CHLORIDE SERPL-SCNC: 108 MMOL/L (ref 100–108)
CO2 SERPL-SCNC: 23 MMOL/L (ref 21–32)
CREAT SERPL-MCNC: 1.3 MG/DL (ref 0.6–1.3)
DIFFERENTIAL METHOD BLD: ABNORMAL
EOSINOPHIL # BLD: 0 K/UL (ref 0–0.4)
EOSINOPHIL NFR BLD: 0 % (ref 0–5)
ERYTHROCYTE [DISTWIDTH] IN BLOOD BY AUTOMATED COUNT: 12.2 % (ref 11.6–14.5)
GLOBULIN SER CALC-MCNC: 3.3 G/DL (ref 2–4)
GLUCOSE SERPL-MCNC: 92 MG/DL (ref 74–99)
HCT VFR BLD AUTO: 42.9 % (ref 36–48)
HGB BLD-MCNC: 14.5 G/DL (ref 13–16)
LYMPHOCYTES # BLD: 1.6 K/UL (ref 0.9–3.6)
LYMPHOCYTES NFR BLD: 13 % (ref 21–52)
MAGNESIUM SERPL-MCNC: 2.5 MG/DL (ref 1.6–2.6)
MCH RBC QN AUTO: 30.8 PG (ref 24–34)
MCHC RBC AUTO-ENTMCNC: 33.8 G/DL (ref 31–37)
MCV RBC AUTO: 91.1 FL (ref 74–97)
MONOCYTES # BLD: 1.4 K/UL (ref 0.05–1.2)
MONOCYTES NFR BLD: 11 % (ref 3–10)
NEUTS SEG # BLD: 9.5 K/UL (ref 1.8–8)
NEUTS SEG NFR BLD: 75 % (ref 40–73)
PLATELET # BLD AUTO: 206 K/UL (ref 135–420)
PMV BLD AUTO: 9.3 FL (ref 9.2–11.8)
POTASSIUM SERPL-SCNC: 4.4 MMOL/L (ref 3.5–5.5)
PROT SERPL-MCNC: 7.3 G/DL (ref 6.4–8.2)
RBC # BLD AUTO: 4.71 M/UL (ref 4.7–5.5)
SODIUM SERPL-SCNC: 139 MMOL/L (ref 136–145)
WBC # BLD AUTO: 12.5 K/UL (ref 4.6–13.2)

## 2019-03-13 PROCEDURE — 83735 ASSAY OF MAGNESIUM: CPT

## 2019-03-13 PROCEDURE — 85025 COMPLETE CBC W/AUTO DIFF WBC: CPT

## 2019-03-13 PROCEDURE — 65270000029 HC RM PRIVATE

## 2019-03-13 PROCEDURE — 99218 HC RM OBSERVATION: CPT

## 2019-03-13 PROCEDURE — 80053 COMPREHEN METABOLIC PANEL: CPT

## 2019-03-13 PROCEDURE — 74011000250 HC RX REV CODE- 250: Performed by: INTERNAL MEDICINE

## 2019-03-13 PROCEDURE — 36415 COLL VENOUS BLD VENIPUNCTURE: CPT

## 2019-03-13 PROCEDURE — 74011250637 HC RX REV CODE- 250/637: Performed by: PSYCHIATRY & NEUROLOGY

## 2019-03-13 PROCEDURE — 74011250636 HC RX REV CODE- 250/636: Performed by: INTERNAL MEDICINE

## 2019-03-13 PROCEDURE — 80156 ASSAY CARBAMAZEPINE TOTAL: CPT

## 2019-03-13 RX ORDER — SODIUM CHLORIDE 9 MG/ML
100 INJECTION, SOLUTION INTRAVENOUS CONTINUOUS
Status: DISCONTINUED | OUTPATIENT
Start: 2019-03-13 | End: 2019-03-14 | Stop reason: HOSPADM

## 2019-03-13 RX ORDER — LEVETIRACETAM 10 MG/ML
1000 INJECTION INTRAVASCULAR EVERY 12 HOURS
Status: DISCONTINUED | OUTPATIENT
Start: 2019-03-13 | End: 2019-03-13

## 2019-03-13 RX ORDER — HEPARIN SODIUM 5000 [USP'U]/ML
5000 INJECTION, SOLUTION INTRAVENOUS; SUBCUTANEOUS EVERY 8 HOURS
Status: DISCONTINUED | OUTPATIENT
Start: 2019-03-13 | End: 2019-03-14 | Stop reason: HOSPADM

## 2019-03-13 RX ORDER — FAMOTIDINE 10 MG/ML
20 INJECTION INTRAVENOUS EVERY 12 HOURS
Status: DISCONTINUED | OUTPATIENT
Start: 2019-03-13 | End: 2019-03-14 | Stop reason: HOSPADM

## 2019-03-13 RX ORDER — LORAZEPAM 2 MG/ML
1 INJECTION INTRAMUSCULAR
Status: DISCONTINUED | OUTPATIENT
Start: 2019-03-13 | End: 2019-03-14 | Stop reason: HOSPADM

## 2019-03-13 RX ORDER — LEVETIRACETAM 750 MG/1
750 TABLET ORAL 2 TIMES DAILY
Qty: 60 TAB | Refills: 0 | Status: SHIPPED | OUTPATIENT
Start: 2019-03-13

## 2019-03-13 RX ADMIN — LEVETIRACETAM 1000 MG: 10 INJECTION INTRAVENOUS at 07:26

## 2019-03-13 RX ADMIN — FOLIC ACID: 5 INJECTION, SOLUTION INTRAMUSCULAR; INTRAVENOUS; SUBCUTANEOUS at 02:55

## 2019-03-13 RX ADMIN — FAMOTIDINE 20 MG: 10 INJECTION, SOLUTION INTRAVENOUS at 02:40

## 2019-03-13 RX ADMIN — SODIUM CHLORIDE 100 ML/HR: 900 INJECTION, SOLUTION INTRAVENOUS at 02:18

## 2019-03-13 RX ADMIN — SODIUM CHLORIDE 100 ML/HR: 900 INJECTION, SOLUTION INTRAVENOUS at 16:34

## 2019-03-13 RX ADMIN — LEVETIRACETAM 750 MG: 500 TABLET ORAL at 20:13

## 2019-03-13 RX ADMIN — FAMOTIDINE 20 MG: 10 INJECTION, SOLUTION INTRAVENOUS at 16:34

## 2019-03-13 NOTE — PROGRESS NOTES
TRANSFER - OUT REPORT:    Verbal report given to 3S RN(name) on [employer].  being transferred to 3S(unit) for routine progression of care       Report consisted of patients Situation, Background, Assessment and   Recommendations(SBAR). Information from the following report(s) SBAR, Kardex, ED Summary, Intake/Output, MAR and Recent Results was reviewed with the receiving nurse. Lines:   Peripheral IV 03/13/19 Right Forearm (Active)   Site Assessment Clean, dry, & intact 3/13/2019  4:00 PM   Phlebitis Assessment 0 3/13/2019  4:00 PM   Infiltration Assessment 0 3/13/2019  4:00 PM   Dressing Status Clean, dry, & intact 3/13/2019  4:00 PM   Dressing Type Transparent 3/13/2019  4:00 PM   Hub Color/Line Status Infusing 3/13/2019  4:00 PM   Action Taken Open ports on tubing capped 3/13/2019  4:00 PM   Alcohol Cap Used Yes 3/13/2019  4:00 PM        Opportunity for questions and clarification was provided.       Patient transported with:   XO1

## 2019-03-13 NOTE — PROGRESS NOTES
Pulm/CC  Patient admitted overnight with seizures  CT head nil acute  Patient awake, alert  On keppra   S/b Neurology and Hospitalist teams  Noted plans for dc home today / or transfer to medical floor  Pulm/CC will be on standby  D.w ICU RN    Christy Casey MD

## 2019-03-13 NOTE — ED NOTES
Pt hourly rounding competed. Safety   Pt (x) resting on stretcher with side rails up and call bell in reach. () in chair    () in parents arms. Toileting   Pt offered ()Bedpan     ()Assistance to Restroom     ()Urinal  Ongoing Updates  Updated on plan of care and status of test results. Pain Management  Inquired as to comfort and offered comfort measures:    (x) warm blankets   (x) dimmed lights    Pt resting in stretcher with eyes closed, still sedate. Pt remains on continuous pulse oximetry and CCM. Family at bedside.

## 2019-03-13 NOTE — PROGRESS NOTES
Reason for Admission:   Seizure disorder  RRAT Score: 4                    Plan for utilizing home health:   no                       Likelihood of Readmission: no                          Transition of Care Plan:    Chart reviewed pt admitted for witnessed seizure activity,positive THC UDS pt does have an seizure disorder on home tegretol, active with [facility] which pt will cont to follow up with  Care Management Interventions  PCP Verified by CM: Yes  Palliative Care Criteria Met (RRAT>21 & CHF Dx)?: No  Mode of Transport at Discharge:  Other (see comment)  Discharge Durable Medical Equipment: No  Physical Therapy Consult: No  Occupational Therapy Consult: No  Current Support Network: Relative's Home  Confirm Follow Up Transport: Self  Discharge Location  Discharge Placement: Home

## 2019-03-13 NOTE — H&P
History & Physical    Patient: Daquan Lujan MRN: [id number]  CSN: [id number]    YOB: 1995  Age: 24 y.o. Sex: male      DOA: 3/12/2019    Chief Complaint:   Chief Complaint   Patient presents with    Seizure          HPI:     Daquan Lujan is a 24 y.o.  male who was visiting brother has hx of seizure and had seizure after having drinks the night before 'family states he had 3 seizures within 25 minutes ; Normally on Tegretol   In ER patient was post ictal and combative hitting staff  Required Versed, Ativan, Haldol, and Benadryl for sedation   Currently over sedated despite several hours being passed asked to admit for lethargy post seizure/  Neuro consulted in ER given IV Kepprra no further seizures noted . Past Medical History:   Diagnosis Date    Seizures [facility])        Past Surgical History:   Procedure Laterality Date    HX ORTHOPAEDIC      foot       History reviewed. No pertinent family history. Social History     Socioeconomic History    Marital status: SINGLE     Spouse name: Not on file    Number of children: Not on file    Years of education: Not on file    Highest education level: Not on file   Tobacco Use    Smoking status: Current Every Day Smoker     Packs/day: 0.25    Smokeless tobacco: Never Used   Substance and Sexual Activity    Alcohol use: Yes     Comment: occasionally    Drug use: Yes     Types: Marijuana       Prior to Admission medications    Medication Sig Start Date End Date Taking? Authorizing Provider   levETIRAcetam (KEPPRA) 500 mg tablet Take 1 Tab by mouth two (2) times a day for 30 days. 3/12/19 4/11/19 Yes Serge Pride MD   carBAMazepine (TEGRETOL) 200 mg tablet Take 200 mg by mouth three (3) times daily.    Yes Jose Cuello MD       No Known Allergies      Review of Systems  Limited due to lethargy and post ictal     Physical Exam:     Physical Exam:  Visit Vitals  /72   Pulse 88   Temp 98.4 °F (36.9 °C)   Resp 15   Ht 5' 5\" (1.651 m)   Wt 63.5 kg (140 lb)   SpO2 98%   BMI 23.30 kg/m²      O2 Device: (P) Room air    Temp (24hrs), Av.4 °F (36.9 °C), Min:98 °F (36.7 °C), Max:99.2 °F (37.3 °C)     1901 -  0700  In: 100 [I.V.:100]  Out: 600 [Urine:600]   No intake/output data recorded. General:  Lethargic non , cooperative, no distress, appears stated age. One word groan to verbal stimuli pulls at IV               Head: Normocephalic, without obvious abnormality, atraumatic. Eyes:  Conjunctivae/corneas clear. PERRL, EOMs intact. Nose: Nares normal. No drainage or sinus tenderness. Neck: Supple, symmetrical, trachea midline, no adenopathy, thyroid: no enlargement, no carotid bruit and no JVD. Lungs:   Clear to auscultation bilaterally. Heart:  Regular rate and rhythm, S1, S2 normal.     Abdomen: Soft, non-tender. Bowel sounds normal.    Extremities: Extremities normal, atraumatic, no cyanosis or edema. Pulses: 2+ and symmetric all extremities. Skin:  No rashes or lesions   Neurologic:   Pupils reactive     No focal motor defecit  Lethargic arousable to tactile stimuli  One word grunt with questions        Labs Reviewed: All lab results for the last 24 hours reviewed. Recent Results (from the past 24 hour(s))   CBC WITH AUTOMATED DIFF    Collection Time: 19  5:20 PM   Result Value Ref Range    WBC 16.5 (H) 4.6 - 13.2 K/uL    RBC 5.14 4.70 - 5.50 M/uL    HGB 15.6 13.0 - 16.0 g/dL    HCT 46.0 36.0 - 48.0 %    MCV 89.5 74.0 - 97.0 FL    MCH 30.4 24.0 - 34.0 PG    MCHC 33.9 31.0 - 37.0 g/dL    RDW 11.7 11.6 - 14.5 %    PLATELET 673 817 - 310 K/uL    MPV 9.6 9.2 - 11.8 FL    NEUTROPHILS 86 (H) 40 - 73 %    LYMPHOCYTES 7 (L) 21 - 52 %    MONOCYTES 7 3 - 10 %    EOSINOPHILS 0 0 - 5 %    BASOPHILS 0 0 - 2 %    ABS. NEUTROPHILS 14.3 (H) 1.8 - 8.0 K/UL    ABS. LYMPHOCYTES 1.1 0.9 - 3.6 K/UL    ABS. MONOCYTES 1.1 0.05 - 1.2 K/UL    ABS. EOSINOPHILS 0.0 0.0 - 0.4 K/UL    ABS.  BASOPHILS 0.0 0.0 - 0.1 K/UL DF AUTOMATED     METABOLIC PANEL, COMPREHENSIVE    Collection Time: 03/12/19  5:20 PM   Result Value Ref Range    Sodium 139 136 - 145 mmol/L    Potassium 3.7 3.5 - 5.5 mmol/L    Chloride 104 100 - 108 mmol/L    CO2 17 (L) 21 - 32 mmol/L    Anion gap 18 3.0 - 18 mmol/L    Glucose 136 (H) 74 - 99 mg/dL    BUN 10 7.0 - 18 MG/DL    Creatinine 1.50 (H) 0.6 - 1.3 MG/DL    BUN/Creatinine ratio 7 (L) 12 - 20      GFR est AA >60 >60 ml/min/1.73m2    GFR est non-AA 57 (L) >60 ml/min/1.73m2    Calcium 9.1 8.5 - 10.1 MG/DL    Bilirubin, total 0.2 0.2 - 1.0 MG/DL    ALT (SGPT) 44 16 - 61 U/L    AST (SGOT) 24 15 - 37 U/L    Alk.  phosphatase 91 45 - 117 U/L    Protein, total 8.5 (H) 6.4 - 8.2 g/dL    Albumin 4.6 3.4 - 5.0 g/dL    Globulin 3.9 2.0 - 4.0 g/dL    A-G Ratio 1.2 0.8 - 1.7     ETHYL ALCOHOL    Collection Time: 03/12/19  5:20 PM   Result Value Ref Range    ALCOHOL(ETHYL),SERUM <3 0 - 3 MG/DL   CARBAMAZEPINE    Collection Time: 03/12/19  5:20 PM   Result Value Ref Range    Carbamazepine 11.0 4.0 - 12.0 ug/mL   URINALYSIS W/ RFLX MICROSCOPIC    Collection Time: 03/12/19  6:20 PM   Result Value Ref Range    Color YELLOW      Appearance CLEAR      Specific gravity 1.013 1.005 - 1.030      pH (UA) 5.0 5.0 - 8.0      Protein 30 (A) NEG mg/dL    Glucose NEGATIVE  NEG mg/dL    Ketone NEGATIVE  NEG mg/dL    Bilirubin NEGATIVE  NEG      Blood SMALL (A) NEG      Urobilinogen 0.2 0.2 - 1.0 EU/dL    Nitrites NEGATIVE  NEG      Leukocyte Esterase NEGATIVE  NEG     DRUG SCREEN, URINE    Collection Time: 03/12/19  6:20 PM   Result Value Ref Range    BENZODIAZEPINES POSITIVE (A) NEG      BARBITURATES NEGATIVE  NEG      THC (TH-CANNABINOL) POSITIVE (A) NEG      OPIATES NEGATIVE  NEG      PCP(PHENCYCLIDINE) NEGATIVE  NEG      COCAINE NEGATIVE  NEG      AMPHETAMINES NEGATIVE  NEG      METHADONE NEGATIVE  NEG      HDSCOM (NOTE)    URINE MICROSCOPIC ONLY    Collection Time: 03/12/19  6:20 PM   Result Value Ref Range    WBC 0 to 3 0 - 5 /hpf RBC 0 to 3 0 - 5 /hpf    Epithelial cells FEW 0 - 5 /lpf    Bacteria FEW (A) NEG /hpf    and EKG    Procedures/imaging: see electronic medical records for all procedures/Xrays and details which were not copied into this note but were reviewed prior to creation of Plan      Assessment/Plan     Active Problems:    Status epilepticus ([facility] [address] (3/13/2019)      Seizure ([facility] [address] (3/13/2019)      Post-ictal state ([facility] [address] (3/13/2019)       LEANDER    THC use     CV:  Hemodynamically stable   No pressor support    Resp:  RA oxygen     Neuro:  Seizure/Post ictal   Po tegretol check level  Starting Keppra   Neuro consult   Lethargic due to meds in ER   Neuro checks and restraints as needed     GI :  Pepcid for GI prevention     Renal :  LEANDER  Starting fluids no baseline repeat in am   Banana bag given     Heme:  Heparin for DVT prevention      DVT/GI Prophylaxis: Hep SQ    Discussed with patient at bedside about hospital admission and my plan care, who understood and agree with my plan care.     Chanel Alvarez MD  3/13/2019 12:22 AM

## 2019-03-13 NOTE — PROGRESS NOTES
Occupational Therapy Screening:  Services may be indicated. [facility] therapy based on results obtained during the nursing admission assessment. The patients chart was reviewed and the patient is may be appropriate for a skilled therapy evaluation s/p medical management. Pt admitted w/ sz and is post-ictal. Pt is young and should respond to medical manage. Please follow mobility program for EOB/OOB and if pt unable,  Please order a consult for occupational therapy if you are in agreement and would like an evaluation to be completed. Thank you.   Elena Chung, OTR/L

## 2019-03-13 NOTE — ROUTINE PROCESS
TRANSFER - IN REPORT:    Verbal report received from Debbi RN (name) on Ngoc Gay.  being received from ICU (unit) for routine progression of care      Report consisted of patients Situation, Background, Assessment and Recommendations(SBAR). Information from the following report(s) SBAR was reviewed with the receiving nurse. Opportunity for questions and clarification was provided. Assessment completed upon patients arrival to unit and care assumed.

## 2019-03-13 NOTE — PROGRESS NOTES
@0688 pt admitted from ED drowsy in bed, on cardiac monitor, fl acc 0, vital signs stable. Pt refused to answer questions but  follow commands and makes request example ask for a warm blanket as a result unable to complete admission data base. No relatives or visitors present. Unable to assess the entire skin on pt body because pt refused and pulled sheet over face. Assessment done and charted in appropriate flow-sheets. Nursing management continues. @0415 Pt reassessed no changes. Pt has periodic lethargy . Pt appears to be purposefully noninteractive with assessment and refuses portions of assessment. @8409 Bedside and Verbal shift change report given to Art Dumont (oncoming nurse) by Tahira Peguero (offgoing nurse). Report included the following information SBAR, Kardex, Intake/Output, MAR, Recent Results, Med Rec Status and Alarm Parameters .

## 2019-03-13 NOTE — PROGRESS NOTES

## 2019-03-13 NOTE — ED NOTES
TRANSFER - OUT REPORT:    Verbal report given to [address] [place] [address] on Key.  being transferred to ICU for routine progression of care       Report consisted of patients Situation, Background, Assessment and   Recommendations(SBAR). Information from the following report(s) SBAR, ED Summary, [facility] and Recent Results was reviewed with the receiving nurse. Lines:   Peripheral IV 03/12/19 Right Antecubital (Active)        Opportunity for questions and clarification was provided.       Patient transported with:   Monitor  Registered Nurse

## 2019-03-13 NOTE — PROGRESS NOTES
Hospitalist Progress Note    Patient: Ngoc Gay. MRN: [id number]  CSN: [id number]    YOB: 1995  Age: 24 y.o. Sex: male    DOA: 3/12/2019 LOS:  LOS: 0 days          Chief Complaint:  seizure        Assessment/Plan     Seizure with post ictal encephalopathy  Seizure disorder  THC positive on UDS    As he awakens which he is, he can have a reg diet, trial OOB on his feet, and if stable, as noted by neurology earlier, he can d/c home later today    Add keppra to carbamazepine-Rx written, and follow up recommended      Disposition :  Patient Active Problem List   Diagnosis Code    Status epilepticus ([facility] [address] G40.901    Seizure ([facility] [address] R56.9    Post-ictal state ([facility] [address] R56.9    Acute kidney injury ([facility] [address] N17.9       Subjective:  He awakens to voice  Denies pain  Does not want to interact  States he is not hungry and is resting  Denies HA    No nursing issues except not full cooperation last evening      Review of systems:    As above      Vital signs/Intake and Output:  Visit Vitals  /73   Pulse 74   Temp 98.2 °F (36.8 °C)   Resp 15   Ht 5' 5\" (1.651 m)   Wt 63.5 kg (140 lb)   SpO2 98%   BMI 23.30 kg/m²     Current Shift:  No intake/output data recorded.   Last three shifts:  03/11 1901 - 03/13 0700  In: 100 [I.V.:100]  Out: 600 [Urine:600]    Exam:    General: Well developed, alert, NAD, thin BM  Head/Neck: NCAT, supple, No masses, No lymphadenopathy  CVS:Regular rate and rhythm, no M/R/G, S1/S2 heard, no thrill  Lungs:Clear to auscultation bilaterally, no wheezes, rhonchi, or rales  Abdomen: Soft, Nontender, No distention, Normal Bowel sounds, No hepatomegaly  Extremities: No C/C/E, pulses palpable 2+  Neuro:grossly normal                  Labs: Results:       Chemistry Recent Labs     03/13/19  0540 03/12/19  1720   GLU 92 136*    139   K 4.4 3.7    104   CO2 23 17*   BUN 11 10   CREA 1.30 1.50*   CA 8.8 9.1   AGAP 8 18   BUCR 8* 7*   AP 75 91   TP 7.3 8.5*   ALB 4.0 4.6   GLOB 3.3 3.9   AGRAT 1.2 1.2      CBC w/Diff Recent Labs     03/13/19  0540 03/12/19  1720   WBC 12.5 16.5*   RBC 4.71 5.14   HGB 14.5 15.6   HCT 42.9 46.0    283   GRANS 75* 86*   LYMPH 13* 7*   EOS 0 0      Cardiac Enzymes No results for input(s): CPK, CKND1, TAVIA in the last 72 hours. No lab exists for component: CKRMB, TROIP   Coagulation No results for input(s): PTP, INR, APTT in the last 72 hours. No lab exists for component: INREXT    Lipid Panel No results found for: CHOL, CHOLPOCT, CHOLX, CHLST, CHOLV, 897507, HDL, LDL, LDLC, DLDLP, 797610, VLDLC, VLDL, TGLX, TRIGL, TRIGP, TGLPOCT, CHHD, CHHDX   BNP No results for input(s): BNPP in the last 72 hours.    Liver Enzymes Recent Labs     03/13/19  0540   TP 7.3   ALB 4.0   AP 75   SGOT 39*      Thyroid Studies No results found for: T4, T3U, TSH, TSHEXT     Procedures/imaging: see electronic medical records for all procedures/Xrays and details which were not copied into this note but were reviewed prior to creation of Marcela Obrien MD

## 2019-03-13 NOTE — ED NOTES
Pt hourly rounding competed. Safety   Pt (x) resting on stretcher with side rails up and call bell in reach. () in chair    () in parents arms. Toileting   Pt offered ()Bedpan     ()Assistance to Restroom     ()Urinal  Ongoing Updates  Updated on plan of care and status of test results. Pain Management  Inquired as to comfort and offered comfort measures:    (x) warm blankets   (x) dimmed lights    Pt continues to refuse to be changed out of his wet clothing. Pt responds to verbal stimulation, but then falls back to sleep during care. Will continue to monitor. Stretcher in lowest position, side rails x 2, call bell within reach.

## 2019-03-13 NOTE — PROGRESS NOTES
[facility] [facility] from [facility] in [address] patient arrived on floor. Alert and oriented x4. Vital signs stable. Seizure precautions placed on patient's bed. Cardiac monitoring placed on patient. Patient Vitals for the past 4 hrs:   Temp Pulse Resp BP SpO2   03/13/19 1842 98.8 °F (37.1 °C) 86 15 115/75 100 %   03/13/19 1700  (!) 59 15 121/78 100 %   03/13/19 1600 98.4 °F (36.9 °C)   113/80 100 %       Bedside and Verbal shift change report given to Jose J Buitrago RN (oncoming nurse) by Meir Meléndez RN (offgoing nurse). Report included the following information SBAR, Kardex, Intake/Output, MAR and Recent Results. Amy Carr called, stated if patient can ambulate hallway without assistance around nurses station, patient can leave if he feels. Night shift RN made aware.

## 2019-03-13 NOTE — CONSULTS
NEUROLOGY CONSULTATION NOTE    Patient: Kodi Schmitt MRN: [id number]  CSN: [id number]    YOB: 1995  Age: 24 y.o. Sex: male    DOA: 3/12/2019 LOS:  LOS: 0 days        Requesting Physician: Dr. Cindy Combs  Reason for Consultation: Seizure         HISTORY OF PRESENT ILLNESS:   Kodi Schmitt is a 24 y.o. male with history of seizure disorder, who presented with recurrent seizures and post-ictal confusion with combativeness. He was given several doses of benzos and was still altered. Therefore, he was admitted for observation. The patient is lethargic but able to give a history. He gets his medications from [facility] and does not remember seeing a neurologist before. However, chart review shows that he was seen by Dr. Eric Barillas, currently on Tegretol. He was loaded with Keppra yesterday. He can not provide clear history due to lethargy and drowsiness. His prior EEG showed \"Abnormal electroencephalogram because of the asymmetrical slow waves over the right anterior temporal region without epileptogenic activity. \"     PAST MEDICAL HISTORY:  Past Medical History:   Diagnosis Date    Seizures ([facility] [address]      PAST SURGICAL HISTORY:  Past Surgical History:   Procedure Laterality Date    HX ORTHOPAEDIC      foot     FAMILY HISTORY:  History reviewed. No pertinent family history.   SOCIAL HISTORY:  Social History     Socioeconomic History    Marital status: SINGLE     Spouse name: Not on file    Number of children: Not on file    Years of education: Not on file    Highest education level: Not on file   Tobacco Use    Smoking status: Current Every Day Smoker     Packs/day: 0.25    Smokeless tobacco: Never Used   Substance and Sexual Activity    Alcohol use: Yes     Comment: occasionally    Drug use: Yes     Types: Marijuana     MEDICATIONS:  Current Facility-Administered Medications   Medication Dose Route Frequency    levETIRAcetam in saline (iso-os) (KEPPRA) infusion 1,000 mg  1,000 mg IntraVENous Q12H    LORazepam (ATIVAN) injection 1 mg  1 mg IntraVENous Q4H PRN    0.9% sodium chloride infusion  100 mL/hr IntraVENous CONTINUOUS    heparin (porcine) injection 5,000 Units  5,000 Units SubCUTAneous Q8H    famotidine (PF) (PEPCID) injection 20 mg  20 mg IntraVENous Q12H    0.9% sodium chloride 1,000 mL with mvi, adult no. 4 with vit K 10 mL, thiamine 747 mg, folic acid 1 mg infusion   IntraVENous Q24H     Prior to Admission medications    Medication Sig Start Date End Date Taking? Authorizing Provider   levETIRAcetam (KEPPRA) 500 mg tablet Take 1 Tab by mouth two (2) times a day for 30 days. 3/12/19 4/11/19 Yes Christian Pride MD   carBAMazepine (TEGRETOL) 200 mg tablet Take 200 mg by mouth three (3) times daily. Yes Other, MD Jose       ALLERGIES:  No Known Allergies    Review of Systems  Unable to review due to drowsiness. PHYSICAL EXAMINATION:     Visit Vitals  /73   Pulse 74   Temp 98.2 °F (36.8 °C)   Resp 15   Ht 5' 5\" (1.651 m)   Wt 63.5 kg (140 lb)   SpO2 98%   BMI 23.30 kg/m²      O2 Device: Room air  GENERAL: Sleepingt, in no apparent distress. HEENT: Moist mucous membranes, sclerae anicteric, scalp is atraumatic. CVS: Regular rate and rhythm, no murmurs or gallops. No carotid bruits. PULMONARY: Clear to auscultation bilaterally. No rales or rhonchi. No wheezing. EXTREMITIES: Normal range of motion at all sites. No deformities. ABDOMEN: Soft, nontender. SKIN: No rashes or ecchymoses. Warm and dry. NEUROLOGIC: Drowsy but arousable, oriented x3. Speech is fluent without any aphasia or dysarthria. Cranial nerves: Face is symmetric with symmetric smile. Facial sensation is intact. Extraocular movements are intact with no nystagmus. Visual fields are full to confrontation. PERRL. Tongue is midline. Palate elevates symmetrically. Hearing intact to speech. Sternocleidomastoid and trapezius strengths are full bilaterally. Motor: Normal tone and normal bulk on all four extremities. Strength is full on all four segmentally. There is no pronator drift or orbiting. Sensory: Intact to pinprick and touch on all four. Normal vibratory sensation on toes bilaterally. Coordination: Intact coordination with finger-nose-finger bilaterally. Normal fine movements. No bradykinesia detected. Deep tendon reflexes: 2+ at biceps, brachioradialis, patella and ankles bilaterally. Labs: Results:       Chemistry Recent Labs     03/13/19  0540 03/12/19  1720   GLU 92 136*    139   K 4.4 3.7    104   CO2 23 17*   BUN 11 10   CREA 1.30 1.50*   CA 8.8 9.1   AGAP 8 18   BUCR 8* 7*   AP 75 91   TP 7.3 8.5*   ALB 4.0 4.6   GLOB 3.3 3.9   AGRAT 1.2 1.2      CBC w/Diff Recent Labs     03/13/19  0540 03/12/19  1720   WBC 12.5 16.5*   RBC 4.71 5.14   HGB 14.5 15.6   HCT 42.9 46.0    283   GRANS 75* 86*   LYMPH 13* 7*   EOS 0 0      Cardiac Enzymes No results for input(s): CPK, CKND1, TAVIA in the last 72 hours. No lab exists for component: CKRMB, TROIP   Coagulation No results for input(s): PTP, INR, APTT in the last 72 hours. No lab exists for component: INREXT    Lipid Panel No results found for: CHOL, CHOLPOCT, CHOLX, CHLST, CHOLV, 771993, HDL, LDL, LDLC, DLDLP, 290233, VLDLC, VLDL, TGLX, TRIGL, TRIGP, TGLPOCT, CHHD, CHHDX   BNP No results for input(s): BNPP in the last 72 hours. Liver Enzymes Recent Labs     03/13/19  0540   TP 7.3   ALB 4.0   AP 75   SGOT 39*      Thyroid Studies No results found for: T4, T3U, TSH, TSHEXT       Radiology:  Ct Head Wo Cont    Result Date: 3/12/2019  EXAM: CT head CLINICAL INDICATION/HISTORY: Confusion and delirium. COMPARISON: None. TECHNIQUE: Axial CT imaging of the head  was obtained from skull base to vertex without intravenous contrast. Coronal and sagittal reconstructions were obtained.   One or more dose reduction techniques were used on this CT: automated exposure control, adjustment of the mAs and/or kVp according to patient's size, and iterative reconstruction techniques. The specific techniques utilized on this CT exam have been documented in the patient's electronic medical record. Digital imaging and communications and medicine (DICOM) format image data are available to nonaffiliated external healthcare facilities or entities on a secure, media free, reciprocally searchable basis with patient authorization for at least a 12 month period after this study. _______________ FINDINGS: BRAIN AND POSTERIOR FOSSA: The sulci, folia, ventricles and basal cisterns are within normal limits for the patient?s age. There is no intracranial hemorrhage, mass effect, or shift of midline structures. There are no areas of abnormal parenchymal attenuation. EXTRA-AXIAL SPACES AND MENINGES: There are no abnormal extra-axial fluid collections. CALVARIUM: No acute osseous abnormality. SINUSES: The visualized portions of the paranasal sinuses and mastoid air cells are well aerated. OTHER: None. _______________     IMPRESSION: Normal CT head. 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 RECOMMENDATIONS:  1. Continue Keppra 750 mg PO BID  2. Continue home dose Carbamazepine. 3. Follow-up by primary neurologist at [facility]. 4. OK to d/c him when he is more awake.     Please do not hesitate to return with any questions.    ------------------------------------  Massiel Forrest MD  3/13/2019  7:46 AM

## 2019-03-13 NOTE — PROGRESS NOTES
3523: Verbal shift change report given to Jodee Stokes (oncoming nurse) by Leeanna Salazar (offgoing nurse). Report included the following information SBAR, Kardex, ED Summary, Intake/Output, MAR, Recent Results and Cardiac Rhythm NSR.   0800: shift assessment completed. See flowsheets. Patient still lethargic. Does not talk only requests blanket. Will monitor. 1200: Reassessment completed. See flowsheets. 1600: Reassessment completed. See flowsheets. Patient to be transferred to Medical. Unable to wake up enough to eat and walk at this time. 1800: Bed assignment in for patient, will call for report. RN to call back. 1820: Report given, bed to come   1827: Patient up to the floor. Belongings with patient. Prescription for Keppra with patient.

## 2019-03-13 NOTE — ED NOTES
Pt hourly rounding competed. Safety   Pt (x) resting on stretcher with side rails up and call bell in reach. () in chair    () in parents arms. Toileting   Pt offered ()Bedpan     ()Assistance to Restroom     ()Urinal  Ongoing Updates  Updated on plan of care and status of test results. Pain Management  Inquired as to comfort and offered comfort measures:    (x) warm blankets   (x) dimmed lights    Pt resting in stretcher with eyes closed, remains 1:1 with sitter.

## 2019-03-14 NOTE — DISCHARGE SUMMARY
Discharge Summary    Patient: Don Carvajal MRN: [id number]  CSN: [id number]    YOB: 1995  Age: 24 y.o. Sex: male    DOA: 3/12/2019 LOS:  LOS: 0 days   Discharge Date:      Primary Care Provider:  Tammy Acevedo MD    Admission Diagnoses: Status epilepticus ([facility] [address] [G40.901]  Seizure ([facility] [address] [R56.9]  Post-ictal state [facility]) [R56.9]    Discharge Diagnoses:    Problem List as of 3/13/2019 Date Reviewed: 3/13/2019          Codes Class Noted - Resolved    Status epilepticus ([facility] [address] ICD-10-CM: G40.901  ICD-9-CM: 345.3  3/13/2019 - Present        Seizure ([facility] [address] ICD-10-CM: R56.9  ICD-9-CM: 780.39  3/13/2019 - Present        Post-ictal state ([facility] [address] ICD-10-CM: R56.9  ICD-9-CM: 780.39  3/13/2019 - Present        Acute kidney injury ([facility] [address] ICD-10-CM: N17.9  ICD-9-CM: 584.9  3/13/2019 - Present              Discharge Medications:     Current Discharge Medication List      START taking these medications    Details   !! levETIRAcetam (KEPPRA) 750 mg tablet Take 1 Tab by mouth two (2) times a day. Qty: 60 Tab, Refills: 0      !! levETIRAcetam (KEPPRA) 500 mg tablet Take 1 Tab by mouth two (2) times a day for 30 days. Qty: 60 Tab, Refills: 0       !! - Potential duplicate medications found. Please discuss with provider. CONTINUE these medications which have NOT CHANGED    Details   carBAMazepine (TEGRETOL) 200 mg tablet Take 200 mg by mouth three (3) times daily. Discharge Condition: Stable    Procedures : None    Consults: Neurology and Pulmonary/Critical Care      PHYSICAL EXAM    Visit Vitals  /75 (BP 1 Location: Left arm)   Pulse 86   Temp 98.8 °F (37.1 °C)   Resp 15   Ht 5' 5\" (1.651 m)   Wt 63.5 kg (140 lb)   SpO2 100%   BMI 23.30 kg/m²     General: Well nourished, well developed male. Awake, cooperative, no acute distress    HEENT: NC, Atraumatic. PERRLA, EOMI. Anicteric sclerae. Lungs:  CTA Bilaterally. No Wheezing/Rhonchi/Rales.   Heart:  Regular  rhythm,  No murmur, No Rubs, No Gallops  Abdomen: Soft, Non distended, Non tender. +Bowel sounds,   Extremities: No c/c/e  Psych:   Not anxious or agitated. Neurologic:  No acute neurological deficits. Admission HPI : Ti Barahona is a 24 y.o.  male who was visiting brother has hx of seizure and had seizure after having drinks the night before 'family states he had 3 seizures within 25 minutes ; Normally on Tegretol   In ER patient was post ictal and combative hitting staff  Required Versed, Ativan, Haldol, and Benadryl for sedation   Currently over sedated despite several hours being passed asked to admit for lethargy post seizure/  Neuro consulted in ER given IV Kepprra no further seizures noted . Hospital Course : This patient was admitted to medical services on March 13, 2019 for seizures. The patient was initially admitted to the intensive care unit as he was lethargic and in his post ictal state became combative requiring multiple medications. A CT head was unremarkable. Pulm/critical care was consulted as the patient was admitted to the intensive care unit, however he was subsequently transferred to the general medical floor as he awoke throughout the day. He was seen in consultation by neurology as well, who recommended continuation of Keppra 750mg twice daily and his usual dose of Tegretol, with outpatient follow up with his usual neurologist at [facility]. Throughout the day the patient has woken up, been ambulatory without any issues, and is stable for discharge.      Activity: Activity as tolerated    Diet: Regular Diet    Follow-up: Primary care provider; Neurology    Disposition: Home    Minutes spent on discharge: 28       Labs: Results:       Chemistry Recent Labs     03/13/19  0540 03/12/19  1720   GLU 92 136*    139   K 4.4 3.7    104   CO2 23 17*   BUN 11 10   CREA 1.30 1.50*   CA 8.8 9.1   AGAP 8 18   BUCR 8* 7*   AP 75 91   TP 7.3 8.5*   ALB 4.0 4.6 GLOB 3.3 3.9   AGRAT 1.2 1.2      CBC w/Diff Recent Labs     03/13/19  0540 03/12/19  1720   WBC 12.5 16.5*   RBC 4.71 5.14   HGB 14.5 15.6   HCT 42.9 46.0    283   GRANS 75* 86*   LYMPH 13* 7*   EOS 0 0      Cardiac Enzymes No results for input(s): CPK, CKND1, TAVIA in the last 72 hours. No lab exists for component: CKRMB, TROIP   Coagulation No results for input(s): PTP, INR, APTT in the last 72 hours. No lab exists for component: INREXT    Lipid Panel No results found for: CHOL, CHOLPOCT, CHOLX, CHLST, CHOLV, 217435, HDL, LDL, LDLC, DLDLP, 384864, VLDLC, VLDL, TGLX, TRIGL, TRIGP, TGLPOCT, CHHD, CHHDX   BNP No results for input(s): BNPP in the last 72 hours. Liver Enzymes Recent Labs     03/13/19  0540   TP 7.3   ALB 4.0   AP 75   SGOT 39*      Thyroid Studies No results found for: T4, T3U, TSH, TSHEXT         Significant Diagnostic Studies: Ct Head Wo Cont    Result Date: 3/12/2019  EXAM: CT head CLINICAL INDICATION/HISTORY: Confusion and delirium. COMPARISON: None. TECHNIQUE: Axial CT imaging of the head  was obtained from skull base to vertex without intravenous contrast. Coronal and sagittal reconstructions were obtained. One or more dose reduction techniques were used on this CT: automated exposure control, adjustment of the mAs and/or kVp according to patient's size, and iterative reconstruction techniques. The specific techniques utilized on this CT exam have been documented in the patient's electronic medical record. Digital imaging and communications and medicine (DICOM) format image data are available to nonaffiliated external healthcare facilities or entities on a secure, media free, reciprocally searchable basis with patient authorization for at least a 12 month period after this study. _______________ FINDINGS: BRAIN AND POSTERIOR FOSSA: The sulci, folia, ventricles and basal cisterns are within normal limits for the patient?s age.  There is no intracranial hemorrhage, mass effect, or shift of midline structures. There are no areas of abnormal parenchymal attenuation. EXTRA-AXIAL SPACES AND MENINGES: There are no abnormal extra-axial fluid collections. CALVARIUM: No acute osseous abnormality. SINUSES: The visualized portions of the paranasal sinuses and mastoid air cells are well aerated. OTHER: None. _______________     IMPRESSION: Normal CT head. No results found for this or any previous visit.         CC: Chyrel Runner, MD

## 2019-03-14 NOTE — DISCHARGE INSTRUCTIONS
Patient Education        Seizure: Care Instructions  Your Care Instructions    Seizures are caused by abnormal patterns of electrical signals in the brain. They are different for each person. Seizures can affect movement, speech, vision, or awareness. Some people have only slight shaking of a hand and do not pass out. Other people may pass out and have violent shaking of the whole body. Some people appear to stare into space. They are awake, but they can't respond normally. Later, they may not remember what happened. You may need tests to identify the type and cause of the seizures. A seizure may occur only once, or you may have them more than one time. Taking medicines as directed and following up with your doctor may help keep you from having more seizures. The doctor has checked you carefully, but problems can develop later. If you notice any problems or new symptoms, get medical treatment right away. Follow-up care is a key part of your treatment and safety. Be sure to make and go to all appointments, and call your doctor if you are having problems. It's also a good idea to know your test results and keep a list of the medicines you take. How can you care for yourself at home? · Be safe with medicines. Take your medicines exactly as prescribed. Call your doctor if you think you are having a problem with your medicine. · Do not do any activity that could be dangerous to you or others until your doctor says it is safe to do so. For example, do not drive a car, operate machinery, swim, or climb ladders. · Be sure that anyone treating you for any health problem knows that you have had a seizure and what medicines you are taking for it. · Identify and avoid things that may make you more likely to have a seizure. These may include lack of sleep, alcohol or drug use, stress, or not eating. · Make sure you go to your follow-up appointment. When should you call for help?   Call 911 anytime you think you may need emergency care. For example, call if:    · You have another seizure.     · You have more than one seizure in 24 hours.     · You have new symptoms, such as trouble walking, speaking, or thinking clearly.    Call your doctor now or seek immediate medical care if:    · You are not acting normally.    Watch closely for changes in your health, and be sure to contact your doctor if you have any problems. Where can you learn more? Go to http://charline-candie.info/. Enter N170 in the search box to learn more about \"Seizure: Care Instructions. \"  Current as of: Alena 3, 2018  Content Version: 11.9  © 6068-0453 [employer], [employer]. Care instructions adapted under license by [employer] (which disclaims liability or warranty for this information). If you have questions about a medical condition or this instruction, always ask your healthcare professional. [address] any warranty or liability for your use of this information.

## 2019-03-14 NOTE — PROGRESS NOTES
2025- Notified Richardson that pt was able to ambulate in hallways and feels well. Pt eager to go home. Family at bedside. Prescription in chart, DC summary not seen. Richardson to check with Dr. Geneva Segura. 2040- Richardson at bedside. DC summary placed. Pt is okay to discharge. 2105- Discharge instructions given to pt and family. Prescription for keppra 750 mg included with education. Unable to obtain electronic signature. Pt and this RN signed paper form of AVS and placed in chart. Opportunities for questions provided.